# Patient Record
Sex: MALE | Race: WHITE
[De-identification: names, ages, dates, MRNs, and addresses within clinical notes are randomized per-mention and may not be internally consistent; named-entity substitution may affect disease eponyms.]

---

## 2019-07-10 ENCOUNTER — HOSPITAL ENCOUNTER (OUTPATIENT)
Dept: HOSPITAL 92 - RAD | Age: 69
Discharge: HOME | End: 2019-07-10
Attending: INTERNAL MEDICINE
Payer: MEDICARE

## 2019-07-10 DIAGNOSIS — J44.9: ICD-10-CM

## 2019-07-10 DIAGNOSIS — R06.00: Primary | ICD-10-CM

## 2019-07-10 PROCEDURE — 71046 X-RAY EXAM CHEST 2 VIEWS: CPT

## 2019-07-10 NOTE — RAD
Exam: Chest 2 views



HISTORY:Dyspnea



Comparison: None



FINDINGS:



Lungs: Hyperinflated. No masses or consolidation.



Cardiac silhouette: Normal size. Th ere is vascular calcification.

Pulmonary vessels: Normal

Pleural Spaces: Clear

Pneumothorax: None



Osseous abnormalities: None of acuity.



IMPRESSION: COPD



Reported By: Jan Palacio 

Electronically Signed:  7/10/2019 1:10 PM

## 2019-07-25 ENCOUNTER — HOSPITAL ENCOUNTER (OUTPATIENT)
Dept: HOSPITAL 92 - ULT | Age: 69
Discharge: HOME | End: 2019-07-25
Attending: FAMILY MEDICINE
Payer: MEDICARE

## 2019-07-25 DIAGNOSIS — Z13.6: Primary | ICD-10-CM

## 2019-07-25 DIAGNOSIS — I73.9: ICD-10-CM

## 2019-07-25 PROCEDURE — 93922 UPR/L XTREMITY ART 2 LEVELS: CPT

## 2019-07-25 PROCEDURE — 76775 US EXAM ABDO BACK WALL LIM: CPT

## 2019-07-25 NOTE — ULT
US Abdominal Aorta: 7/25/2019 12:00 AM



CLINICAL HISTORY: Encounter for abdominal aortic aneurysm screening.



STUDY: Limited abdominal ultrasound of the aorta.



TECHNIQUE: A limited ultrasound of the abdominal aorta was performed. Spectral analysis of the Dopple
r waveform was performed.



COMPARISON: None.                  



FINDINGS:



The aorta is normal in caliber without evidence of aneurysmal dilatation and measures 2.2 cm in great
est dimension.



The common iliac arteries are normal in caliber.





IMPRESSION:



No evidence of abdominal aortic aneurysm.



Reported By: Kyle Brown 

Electronically Signed:  7/25/2019 10:23 AM

## 2019-07-26 NOTE — ULT
LOWER EXTREMITY ARTERIAL EVALUATION 

7/25/19

 

HISTORY: 

Some sort of angioplasty procedure on his lower extremity. 

 

Examination of the right leg demonstrates reasonably normal waveforms at all levels with an ankle-arm
 index of 1.13. 

 

Left lower extremity demonstrates normal waveforms at all levels with an ankle-arm index  of 1.16.

 

This would be considered a normal resting arterial study of the lower extremities.

## 2020-05-11 ENCOUNTER — HOSPITAL ENCOUNTER (EMERGENCY)
Dept: HOSPITAL 92 - ERS | Age: 70
LOS: 1 days | Discharge: HOME | End: 2020-05-12
Payer: MEDICARE

## 2020-05-11 DIAGNOSIS — I25.2: ICD-10-CM

## 2020-05-11 DIAGNOSIS — J44.1: Primary | ICD-10-CM

## 2020-05-11 DIAGNOSIS — I10: ICD-10-CM

## 2020-05-11 LAB
ALBUMIN SERPL BCG-MCNC: 3.9 G/DL (ref 3.4–4.8)
ALP SERPL-CCNC: 57 U/L (ref 40–110)
ALT SERPL W P-5'-P-CCNC: 16 U/L (ref 8–55)
ANION GAP SERPL CALC-SCNC: 14 MMOL/L (ref 10–20)
AST SERPL-CCNC: 16 U/L (ref 5–34)
BILIRUB SERPL-MCNC: 0.3 MG/DL (ref 0.2–1.2)
BUN SERPL-MCNC: 8 MG/DL (ref 8.4–25.7)
CALCIUM SERPL-MCNC: 8.6 MG/DL (ref 7.8–10.44)
CHLORIDE SERPL-SCNC: 96 MMOL/L (ref 98–107)
CO2 SERPL-SCNC: 28 MMOL/L (ref 23–31)
CREAT CL PREDICTED SERPL C-G-VRATE: 0 ML/MIN (ref 70–130)
GLOBULIN SER CALC-MCNC: 2.5 G/DL (ref 2.4–3.5)
GLUCOSE SERPL-MCNC: 97 MG/DL (ref 83–110)
HGB BLD-MCNC: 13.7 G/DL (ref 14–18)
MCH RBC QN AUTO: 30.9 PG (ref 27–31)
MCV RBC AUTO: 100 FL (ref 78–98)
MDIFF COMPLETE?: YES
PLATELET # BLD AUTO: 253 THOU/UL (ref 130–400)
POTASSIUM SERPL-SCNC: 4.3 MMOL/L (ref 3.5–5.1)
RBC # BLD AUTO: 4.43 MILL/UL (ref 4.7–6.1)
SODIUM SERPL-SCNC: 134 MMOL/L (ref 136–145)
WBC # BLD AUTO: 6.8 THOU/UL (ref 4.8–10.8)

## 2020-05-11 PROCEDURE — 94640 AIRWAY INHALATION TREATMENT: CPT

## 2020-05-11 PROCEDURE — 80053 COMPREHEN METABOLIC PANEL: CPT

## 2020-05-11 PROCEDURE — 84484 ASSAY OF TROPONIN QUANT: CPT

## 2020-05-11 PROCEDURE — 85025 COMPLETE CBC W/AUTO DIFF WBC: CPT

## 2020-05-11 PROCEDURE — 71045 X-RAY EXAM CHEST 1 VIEW: CPT

## 2020-05-11 PROCEDURE — 36415 COLL VENOUS BLD VENIPUNCTURE: CPT

## 2020-05-11 PROCEDURE — 93005 ELECTROCARDIOGRAM TRACING: CPT

## 2020-05-12 NOTE — RAD
PORTABLE CHEST 1 VIEW:

 

DATE: 5/12/2020.

TIME: 1:14 AM.

 

HISTORY: 

Shortness of breath.

 

COMPARISON: 

7/10/2019.

 

FINDINGS: 

The heart size is normal.  The aorta is tortuous.  Changes of COPD are again seen.  No lobar consolid
ation, pneumothoraces, or pleural effusions are identified.  The aorta is tortuous.  

 

IMPRESSION: 

No acute process.

 

POS: [facility]